# Patient Record
Sex: MALE | Race: WHITE
[De-identification: names, ages, dates, MRNs, and addresses within clinical notes are randomized per-mention and may not be internally consistent; named-entity substitution may affect disease eponyms.]

---

## 2018-12-27 ENCOUNTER — HOSPITAL ENCOUNTER (EMERGENCY)
Dept: HOSPITAL 97 - ER | Age: 83
Discharge: ANOTHER HOSPITAL | End: 2018-12-27
Payer: OTHER GOVERNMENT

## 2018-12-27 VITALS — SYSTOLIC BLOOD PRESSURE: 120 MMHG | TEMPERATURE: 99.6 F | DIASTOLIC BLOOD PRESSURE: 72 MMHG

## 2018-12-27 VITALS — OXYGEN SATURATION: 97 %

## 2018-12-27 DIAGNOSIS — I10: ICD-10-CM

## 2018-12-27 DIAGNOSIS — Z79.01: ICD-10-CM

## 2018-12-27 DIAGNOSIS — E78.00: ICD-10-CM

## 2018-12-27 DIAGNOSIS — K80.10: Primary | ICD-10-CM

## 2018-12-27 DIAGNOSIS — Z95.818: ICD-10-CM

## 2018-12-27 DIAGNOSIS — I25.2: ICD-10-CM

## 2018-12-27 DIAGNOSIS — Z79.82: ICD-10-CM

## 2018-12-27 LAB
ALBUMIN SERPL BCP-MCNC: 4 G/DL (ref 3.4–5)
ALP SERPL-CCNC: 244 U/L (ref 45–117)
ALT SERPL W P-5'-P-CCNC: 439 U/L (ref 12–78)
AST SERPL W P-5'-P-CCNC: 602 U/L (ref 15–37)
BUN BLD-MCNC: 23 MG/DL (ref 7–18)
GLUCOSE SERPLBLD-MCNC: 160 MG/DL (ref 74–106)
HCT VFR BLD CALC: 43.5 % (ref 39.6–49)
INR BLD: 1.03
LIPASE SERPL-CCNC: 58 U/L (ref 73–393)
LYMPHOCYTES # SPEC AUTO: 0.9 K/UL (ref 0.7–4.9)
MAGNESIUM SERPL-MCNC: 2.1 MG/DL (ref 1.8–2.4)
NT-PROBNP SERPL-MCNC: 1473 PG/ML (ref ?–450)
PMV BLD: 8.6 FL (ref 7.6–11.3)
POTASSIUM SERPL-SCNC: 4 MMOL/L (ref 3.5–5.1)
RBC # BLD: 4.99 M/UL (ref 4.33–5.43)
TROPONIN (EMERG DEPT USE ONLY): < 0.02 NG/ML (ref 0–0.04)

## 2018-12-27 PROCEDURE — 87088 URINE BACTERIA CULTURE: CPT

## 2018-12-27 PROCEDURE — 83690 ASSAY OF LIPASE: CPT

## 2018-12-27 PROCEDURE — 74177 CT ABD & PELVIS W/CONTRAST: CPT

## 2018-12-27 PROCEDURE — 83880 ASSAY OF NATRIURETIC PEPTIDE: CPT

## 2018-12-27 PROCEDURE — 96366 THER/PROPH/DIAG IV INF ADDON: CPT

## 2018-12-27 PROCEDURE — 96361 HYDRATE IV INFUSION ADD-ON: CPT

## 2018-12-27 PROCEDURE — 87086 URINE CULTURE/COLONY COUNT: CPT

## 2018-12-27 PROCEDURE — 85025 COMPLETE CBC W/AUTO DIFF WBC: CPT

## 2018-12-27 PROCEDURE — 84484 ASSAY OF TROPONIN QUANT: CPT

## 2018-12-27 PROCEDURE — 93005 ELECTROCARDIOGRAM TRACING: CPT

## 2018-12-27 PROCEDURE — 80048 BASIC METABOLIC PNL TOTAL CA: CPT

## 2018-12-27 PROCEDURE — 80076 HEPATIC FUNCTION PANEL: CPT

## 2018-12-27 PROCEDURE — 85610 PROTHROMBIN TIME: CPT

## 2018-12-27 PROCEDURE — 36415 COLL VENOUS BLD VENIPUNCTURE: CPT

## 2018-12-27 PROCEDURE — 71045 X-RAY EXAM CHEST 1 VIEW: CPT

## 2018-12-27 PROCEDURE — 96365 THER/PROPH/DIAG IV INF INIT: CPT

## 2018-12-27 PROCEDURE — 99285 EMERGENCY DEPT VISIT HI MDM: CPT

## 2018-12-27 PROCEDURE — 83735 ASSAY OF MAGNESIUM: CPT

## 2018-12-27 PROCEDURE — 96375 TX/PRO/DX INJ NEW DRUG ADDON: CPT

## 2018-12-27 NOTE — RAD REPORT
EXAM DESCRIPTION:  [place] Single View12/27/2018 2:26 am

 

CLINICAL HISTORY:  Chest pain

 

COMPARISON:  2016

 

FINDINGS:   The lungs appear clear of acute infiltrate. The heart is moderately enlarged.

 

Aorta is tortuous/ectatic

 

IMPRESSION:   No acute abnormalities displayed

## 2018-12-27 NOTE — EKG
Test Date:    2018-12-27               Test Time:    02:07:10

Technician:                                       

                                                     

MEASUREMENT RESULTS:                                       

Intervals:                                           

Rate:         92                                     

KY:                                                  

QRSD:         134                                    

QT:           376                                    

QTc:          464                                    

Axis:                                                

P:                                                   

KY:                                                  

QRS:          -37                                    

T:            0                                      

                                                     

INTERPRETIVE STATEMENTS:                                       

                                                     

Atrial flutter with variable AV block with premature ventricular or

aberrantly 

conducted complexes

Left axis deviation

Nonspecific intraventricular block

Inferior infarct, age undetermined

Cannot rule out Anterior infarct, age undetermined

Abnormal ECG

Compared to ECG 11/20/2016 05:31:15

Ventricular premature complex(es) now present

Sinus bradycardia no longer present

First degree AV block no longer present

ST (T wave) deviation no longer present

Possible ischemia no longer present

Myocardial infarct finding still present



Electronically Signed On 12-27-18 06:24:39 CST by Rashawn Call

## 2018-12-27 NOTE — ER
Nurse's Notes                                                                                     

 [facility]                                                                

Name: Santo Kyle                                                                                 

Age: 83 yrs                                                                                       

Sex: Male                                                                                         

: 1935                                                                                   

MRN: [id number]                                                                                   

Arrival Date: 2018                                                                          

Time: 01:57                                                                                       

Account#: [id number]                                                                            

Bed 8                                                                                             

Private MD:                                                                                       

Diagnosis: Abdominal tenderness;Cholecystitis;Cholelithiasis                                      

                                                                                                  

Presentation:                                                                                     

                                                                                             

02:07 Presenting complaint: Patient states: upper abd pain since . pt denies N/V/D.       ak1 

      Transition of care: patient was not received from another setting of care. Onset of         

      symptoms was 2018. Risk Assessment: Do you want to hurt yourself or            

      someone else? Patient reports no desire to harm self or others. Initial Sepsis Screen:      

      Does the patient meet any 2 criteria? No. Patient's initial sepsis screen is negative.      

      Does the patient have a suspected source of infection? No. Patient's initial sepsis         

      screen is negative. Care prior to arrival: None.                                            

02:07 Method Of Arrival: Wheelchair                                                           ak1 

02:07 Acuity: TIM 3                                                                           ak1 

02:21 Note pt took 0.4SL nitro at 0100.                                                       ak1 

                                                                                                  

Triage Assessment:                                                                                

02:19 General: Appears uncomfortable, Behavior is calm, cooperative. Pain: Pain began     ak1 

      tonight. pt took 0.4 SL nitro at 0100. EENT: No signs and/or symptoms were reported         

      regarding the EENT system. Neuro: No deficits noted. Cardiovascular: Reports upper abd      

      pain. pt took 0.4 SL nitro at 0100. Respiratory: No deficits noted. GI: Abdomen is          

      round non-distended, Bowel sounds present X 4 quads. Abd is soft and non tender X 4         

      quads. Patient currently denies constipation, diarrhea, nausea, vomiting. : No signs      

      and/or symptoms were reported regarding the genitourinary system. Derm: No signs and/or     

      symptoms reported regarding the dermatologic system. Musculoskeletal: No signs and/or       

      symptoms reported regarding the musculoskeletal system.                                     

                                                                                                  

Historical:                                                                                       

- Allergies:                                                                                      

02:11 No Known Allergies;                                                                     ak1 

- Home Meds:                                                                                      

02:19 amlodipine 10 mg tab 1 tab once daily [Active]; aspirin 81 mg Oral chew 1 tab once      ak1 

      daily [Active]; atorvastatin 40 mg Oral tab 1 tab bedtime [Active]; hydralazine 25 mg       

      Oral tab 1 tab 2 times per day [Active]; lactulose 10 gram/15 mL (15 mL) Oral soln 15       

      mL PRN [Active]; nitroglycerin 0.4 mg SL subl 1 tab PRN [Active]; terazosin 2 mg Oral       

      cap 1 cap bedtime [Active]; zolpidem 5 mg Oral tab 1 tab bedtime [Active]; Metoprolol       

      Tartrate Oral PRN [Active]; Plavix 75 mg Oral tab 1 tab once daily [Active];                

- PMHx:                                                                                           

02:11 CAD; Hypertension; Myocardial infarction; High Cholesterol;                             ak1 

- PSHx:                                                                                           

02:11 Heart stents; bilateral knee replacements; prostate; left eye sx; left middle finger    ak1 

      amputation.;                                                                                

                                                                                                  

- Immunization history:: Adult Immunizations up to date.                                          

- Social history:: Smoking status: Patient/guardian denies using tobacco.                         

- Ebola Screening: : No symptoms or risks identified at this time.                                

- Family history:: not pertinent.                                                                 

                                                                                                  

                                                                                                  

Screenin:21 Abuse screen: Denies threats or abuse. Denies injuries from another. Nutritional        ak1 

      screening: No deficits noted. Tuberculosis screening: No symptoms or risk factors           

      identified. Fall Risk None identified.                                                      

                                                                                                  

Assessment:                                                                                       

02:22 Pain: Pain does not radiate.                                                            ak1 

02:22 Reassessment: Patient appears in no apparent distress at this time. No changes from     ak1 

      previously documented assessment. see triage assessment.                                    

02:39 Reassessment: pt began vomiting, ERP notified and at beside for assessment.             ak1 

03:27 Reassessment: pt drinking oral contrast.                                                ak1 

04:01 Reassessment: pt in CT.                                                                 ak1 

05:49 Reassessment: Patient appears in no apparent distress at this time. No changes from     ak1 

      previously documented assessment. Patient and/or family updated on plan of care and         

      expected duration. Pain level reassessed. Patient is alert, oriented x 3, equal             

      unlabored respirations, skin warm/dry/pink. Patient states symptoms have improved.          

                                                                                                  

Vital Signs:                                                                                      

02:11  / 75; Pulse 103; Resp 20; Temp 97.6(O); Pulse Ox 97% on R/A; Weight 95.25 kg     ak1 

      (R); Height 5 ft. 5 in. (165.10 cm) (R); Pain 5/10;                                         

02:33  / 76; Pulse 106; Resp 22; Temp 99; Pulse Ox 96% on R/A;                          ak1 

03:06  / 81; Pulse 103; Resp 25; Temp 99.4; Pulse Ox 97% on 2 lpm NC;                   ak1 

05:48  / 72; Pulse 102; Resp 20; Temp 99.6; Pulse Ox 97% on 2 lpm NC;                   ak1 

02:11 Body Mass Index 34.95 (95.25 kg, 165.10 cm)                                             ak1 

                                                                                                  

ED Course:                                                                                        

01:57 Patient arrived in ED.                                                                  ag3 

02:07 Aishwarya David, RN is Primary Nurse.                                                     ak1 

02:07 Triage completed.                                                                       ak1 

02:11 Arm band placed on Patient placed in an exam room, on a stretcher, Patient notified of  ak1 

      wait time. EKG completed in triage. Results shown to MD.                                    

02:14 Inserted saline lock: 20 gauge in right antecubital area, using aseptic technique.      ea  

      Blood collected.                                                                            

02:19 EKG done, by ED staff. Patient maintains SpO2 saturation greater than 95% on room air.  ak1 

02:22 Patient has correct armband on for positive identification. Placed in gown. Bed in low  ak1 

      position. Call light in reach. Side rails up X 1. Adult w/ patient. Cardiac monitor on.     

      Pulse ox on. NIBP on. Door closed. Warm blanket given.                                      

02:24 X-ray completed. Portable x-ray completed in exam room. Patient tolerated procedure     kw  

      well.                                                                                       

02:25 XRAY Chest (1 view) In Process Unspecified.                                             EDMS

02:32 Mehran Barrios MD is Attending Physician.                                             [facility] 

03:10 Notified ED physician of a critical lab result(s). AST \T\ ALT.                           jd3

03:56 CT Abd/Pelvis - W/Contrast In Process Unspecified.                                      EDMS

04:09 CT completed. Patient tolerated procedure well. Patient moved to CT via stretcher.      vm2 

      Patient moved back from CT.                                                                 

04:22 No provider procedures requiring assistance completed. Patient transferred, IV remains  ak1 

      in place.                                                                                   

                                                                                                  

Administered Medications:                                                                         

02:39 Drug: Zofran 4 mg Route: IVP; Site: right antecubital;                                  ak1 

02:40 Follow up: Response: No adverse reaction                                                ak1 

02:51 Drug: Pepcid 20 mg Route: IVP; Site: right antecubital;                                 ak1 

02:51 Follow up: Response: No adverse reaction                                                ak1 

02:51 Drug: NS 0.9% 1000 ml Route: IV; Rate: 125 ml/hr; Site: right antecubital;              ak1 

05:50 Follow up: IV Status: Infusion continued upon transfer                                  ak1 

03:26 Drug: Zosyn 3.375 grams Route: IVPB; Infused Over: 60 mins; Site: right antecubital;    ak1 

05:50 Follow up: IV Status: Completed infusion; IV Intake: 100ml                              ak1 

                                                                                                  

                                                                                                  

Intake:                                                                                           

05:50 IV: 100ml; Total: 100ml.                                                                ak1 

                                                                                                  

Outcome:                                                                                          

03:17 ER care complete, transfer ordered by MD. pascual 

06:47 Patient left the ED.                                                                    ak1 

                                                                                                  

Signatures:                                                                                       

Dispatcher [employer]                           EDMS                                                 

Mehran Barrios MD MD cha Whitley, Kimberlee kw Krenek, Amber RN                       RN   ak1                                                  

Yaneli Valadez Elena, RN RN ea Davies, Jonathon, RN RN jd3 Gomez, Alice ag3                                                  

                                                                                                  

**************************************************************************************************

## 2018-12-27 NOTE — RAD REPORT
EXAM DESCRIPTION:  CT - Abdomen   Pelvis W Contrast - 12/27/2018 3:55 am

 

CLINICAL HISTORY:  Abdominal pain with nausea.

 

COMPARISON:  October 2018

 

TECHNIQUE:  Computed axial tomography of the abdomen pelvis was obtained. 100 cc Isovue-300 was admin
istered intravenously. Oral contrast was given. Preliminary report generated by virtual radiologic an
d reviewed prior to dictation

 

All CT scans are performed using dose optimization technique as appropriate and may include automated
 exposure control or mA/KV adjustment according to patient size.

 

FINDINGS:  The liver, spleen, pancreas, adrenal and kidneys appear unremarkable.

 

Diverticula stem from the colon. Minimal stranding is seen adjacent to the distal sigmoid colon

 

Gallstones without gallbladder wall thickening.

 

Small hiatal hernia. Contrast in the esophagus indicates GE reflux.

 

Mild enlargement prostate gland. Small left inguinal hernia

 

IMPRESSION:  Minimal sigmoid diverticulitis. Exam was discussed with Dr. Begum in the Emergency zeb
 at 8:40 a.m. December 27, 2018

## 2018-12-27 NOTE — EDPHYS
Physician Documentation                                                                           

 [facility]                                                                

Name: Santo Kyle                                                                                 

Age: 83 yrs                                                                                       

Sex: Male                                                                                         

: 1935                                                                                   

MRN: [id number]                                                                                   

Arrival Date: 2018                                                                          

Time: 01:57                                                                                       

Account#: [id number]                                                                            

Bed 8                                                                                             

Private MD:                                                                                       

ED Physician Mheran Barrios                                                                      

HPI:                                                                                              

                                                                                             

02:42 This 83 yrs old  Male presents to ER via Wheelchair with complaints of Chest   samara 

      Pain.                                                                                       

02:42 The patient or guardian reports chest pain that is located primarily in the epigastric  samara 

      area. Onset: last night. The pain does not radiate. Associated signs and symptoms: The      

      patient has no apparent associated signs or symptoms. The chest pain is described as        

      aching. Duration: The patient or guardian reports a single episode, that is still           

      ongoing, and unchanged. Modifying factors: The symptoms are alleviated by nothing. the      

      symptoms are aggravated by nothing. Severity of pain: At its worst the pain was mild        

      moderate in the emergency department the pain is unchanged. The patient has not             

      experienced similar symptoms in the past.                                                   

                                                                                                  

Historical:                                                                                       

- Allergies:                                                                                      

02:11 No Known Allergies;                                                                     ak1 

- Home Meds:                                                                                      

02:19 amlodipine 10 mg tab 1 tab once daily [Active]; aspirin 81 mg Oral chew 1 tab once      ak1 

      daily [Active]; atorvastatin 40 mg Oral tab 1 tab bedtime [Active]; hydralazine 25 mg       

      Oral tab 1 tab 2 times per day [Active]; lactulose 10 gram/15 mL (15 mL) Oral soln 15       

      mL PRN [Active]; nitroglycerin 0.4 mg SL subl 1 tab PRN [Active]; terazosin 2 mg Oral       

      cap 1 cap bedtime [Active]; zolpidem 5 mg Oral tab 1 tab bedtime [Active]; Metoprolol       

      Tartrate Oral PRN [Active]; Plavix 75 mg Oral tab 1 tab once daily [Active];                

- PMHx:                                                                                           

02:11 CAD; Hypertension; Myocardial infarction; High Cholesterol;                             ak1 

- PSHx:                                                                                           

02:11 Heart stents; bilateral knee replacements; prostate; left eye sx; left middle finger    ak1 

      amputation.;                                                                                

                                                                                                  

- Immunization history:: Adult Immunizations up to date.                                          

- Social history:: Smoking status: Patient/guardian denies using tobacco.                         

- Ebola Screening: : No symptoms or risks identified at this time.                                

- Family history:: not pertinent.                                                                 

                                                                                                  

                                                                                                  

ROS:                                                                                              

02:42 Constitutional: Negative for fever, chills, and weight loss, Eyes: Negative for injury, samara 

      pain, redness, and discharge, ENT: Negative for injury, pain, and discharge, Neck:          

      Negative for injury, pain, and swelling, Respiratory: Negative for shortness of breath,     

      cough, wheezing, and pleuritic chest pain, Back: Negative for injury and pain, :          

      Negative for injury, bleeding, discharge, and swelling, MS/Extremity: Negative for          

      injury and deformity, Skin: Negative for injury, rash, and discoloration, Neuro:            

      Negative for headache, weakness, numbness, tingling, and seizure, Psych: Negative for       

      depression, anxiety, suicide ideation, homicidal ideation, and hallucinations,              

      Allergy/Immunology: Negative for hives, rash, and allergies, Endocrine: Negative for        

      neck swelling, polydipsia, polyuria, polyphagia, and marked weight changes,                 

      Hematologic/Lymphatic: Negative for swollen nodes, abnormal bleeding, and unusual           

      bruising.                                                                                   

02:42 Cardiovascular: Positive for                                                                

02:42 Abdomen/GI: Positive for abdominal pain, nausea, vomiting, of the epigastric area,          

      right upper quadrant and left upper quadrant.                                               

                                                                                                  

Exam:                                                                                             

02:42 Constitutional:  This is a well developed, well nourished patient who is awake, alert,  samara 

      and in no acute distress. Head/Face:  Normocephalic, atraumatic. Eyes:  Pupils equal        

      round and reactive to light, extra-ocular motions intact.  Lids and lashes normal.          

      Conjunctiva and sclera are non-icteric and not injected.  Cornea within normal limits.      

      Periorbital areas with no swelling, redness, or edema. ENT:  Nares patent. No nasal         

      discharge, no septal abnormalities noted.  Tympanic membranes are normal and external       

      auditory canals are clear.  Oropharynx with no redness, swelling, or masses, exudates,      

      or evidence of obstruction, uvula midline.  Mucous membranes moist. Neck:  Trachea          

      midline, no thyromegaly or masses palpated, and no cervical lymphadenopathy.  Supple,       

      full range of motion without nuchal rigidity, or vertebral point tenderness.  No            

      Meningismus. Chest/axilla:  Normal chest wall appearance and motion.  Nontender with no     

      deformity.  No lesions are appreciated. Respiratory:  Lungs have equal breath sounds        

      bilaterally, clear to auscultation and percussion.  No rales, rhonchi or wheezes noted.     

       No increased work of breathing, no retractions or nasal flaring. Back:  No spinal          

      tenderness.  No costovertebral tenderness.  Full range of motion. Male :  Normal          

      genitalia with no discharge or lesions. Skin:  Warm, dry with normal turgor.  Normal        

      color with no rashes, no lesions, and no evidence of cellulitis. MS/ Extremity:  Pulses     

      equal, no cyanosis.  Neurovascular intact.  Full, normal range of motion. Neuro:  Awake     

      and alert, GCS 15, oriented to person, place, time, and situation.  Cranial nerves          

      II-XII grossly intact.  Motor strength 5/5 in all extremities.  Sensory grossly intact.     

       Cerebellar exam normal.  Normal gait. Psych:  Awake, alert, with orientation to            

      person, place and time.  Behavior, mood, and affect are within normal limits.               

02:42 Cardiovascular: Rate: tachycardic, Rhythm: irregularly irregular, Pulses: Pulses are 4+     

      in bilateral radial, brachial, femoral, popliteal, posterior tibial and and dorsalis        

      pedis arteries.. Heart sounds: normal, Edema: is not appreciated, JVD: is not               

      appreciated.                                                                                

                                                                                                  

Vital Signs:                                                                                      

02:11  / 75; Pulse 103; Resp 20; Temp 97.6(O); Pulse Ox 97% on R/A; Weight 95.25 kg     ak1 

      (R); Height 5 ft. 5 in. (165.10 cm) (R); Pain 5/10;                                         

02:33  / 76; Pulse 106; Resp 22; Temp 99; Pulse Ox 96% on R/A;                          ak1 

03:06  / 81; Pulse 103; Resp 25; Temp 99.4; Pulse Ox 97% on 2 lpm NC;                   ak1 

05:48  / 72; Pulse 102; Resp 20; Temp 99.6; Pulse Ox 97% on 2 lpm NC;                   ak1 

02:11 Body Mass Index 34.95 (95.25 kg, 165.10 cm)                                             ak1 

                                                                                                  

MDM:                                                                                              

02:35 Patient medically screened.                                                             [facility] 

02:47 Data reviewed: vital signs, nurses notes, lab test result(s), EKG, radiologic studies,  [facility] 

      CT scan, plain films.                                                                       

                                                                                                  

                                                                                             

02:11 Order name: Basic Metabolic Panel; Complete Time: 03:11                                 ms  

                                                                                             

02:11 Order name: CBC with Diff; Complete Time: 03:11                                         ms  

                                                                                             

02:11 Order name: LFT's; Complete Time: 03:11                                                 ms  

                                                                                             

02:11 Order name: Magnesium; Complete Time: 03:11                                             ms  

                                                                                             

02:11 Order name: NT PRO-BNP; Complete Time: 03:11                                            ms  

                                                                                             

02:11 Order name: PT-INR; Complete Time: 03:11                                                ms  

                                                                                             

02:11 Order name: Troponin (emerg Dept Use Only); Complete Time: 03:11                        ms  

                                                                                             

02:11 Order name: XRAY Chest (1 view)                                                         ms  

                                                                                             

02:42 Order name: Urine Culture                                                               [facility] 

                                                                                             

02:42 Order name: CT Abd/Pelvis - W/Contrast                                                  [facility] 

                                                                                             

02:55 Order name: Lipase; Complete Time: 03:11                                                EDMS

                                                                                             

02:11 Order name: EKG; Complete Time: 02:11                                                   ms  

                                                                                             

02:11 Order name: Cardiac monitoring; Complete Time: 02:11                                    ms  

                                                                                             

02:11 Order name: EKG - Nurse/Tech; Complete Time: 02:11                                      ms  

                                                                                             

02:11 Order name: IV Saline Lock; Complete Time: 02:11                                        ms  

                                                                                             

02:11 Order name: Labs collected and sent; Complete Time: 02:11                               ms  

                                                                                             

02:11 Order name: O2 Per Protocol; Complete Time: 02:11                                       ms  

                                                                                             

02:11 Order name: O2 Sat Monitoring; Complete Time: 02:11                                     ms  

                                                                                             

02:12 Order name: Urine Dipstick-Ancillary (obtain specimen); Complete Time: 04:41            ms  

                                                                                                  

Administered Medications:                                                                         

02:39 Drug: Zofran 4 mg Route: IVP; Site: right antecubital;                                  ak1 

02:40 Follow up: Response: No adverse reaction                                                ak1 

02:51 Drug: Pepcid 20 mg Route: IVP; Site: right antecubital;                                 ak1 

02:51 Follow up: Response: No adverse reaction                                                ak1 

02:51 Drug: NS 0.9% 1000 ml Route: IV; Rate: 125 ml/hr; Site: right antecubital;              ak1 

05:50 Follow up: IV Status: Infusion continued upon transfer                                  ak1 

03:26 Drug: Zosyn 3.375 grams Route: IVPB; Infused Over: 60 mins; Site: right antecubital;    ak1 

05:50 Follow up: IV Status: Completed infusion; IV Intake: 100ml                              ak1 

                                                                                                  

                                                                                                  

Disposition:                                                                                      

18 03:17 Transfer ordered to [facility]. Diagnosis are Abdominal        

  tenderness, Cholecystitis, Cholelithiasis.                                                      

- Reason for transfer: Higher level of care.                                                      

- Accepting physician is to [facility].                                                                  

- Condition is Stable.                                                                            

- Problem is new.                                                                                 

- Symptoms have improved.                                                                         

                                                                                                  

                                                                                                  

                                                                                                  

Signatures:                                                                                       

Dispatcher [employer]                           EDMS                                                 

Mehran Barrios MD MD cha Solis, Maria ms Krenek, Amber, RN                       RN   ak1                                                  

                                                                                                  

Corrections: (The following items were deleted from the chart)                                    

02:55 02:42 LIPASE+C.LAB.BRZ ordered. [facility]                                                    EDMS

06:47 03:17 2018 03:17 Transfer ordered to [facility]. Diagnosis is ak1 

      Abdominal tenderness; Cholecystitis; Cholelithiasis. Reason for transfer: Higher level      

      of care. Accepting physician is to [facility]. Condition is Stable. Problem is new. Symptoms       

      have improved. samara                                                                          

                                                                                                  

**************************************************************************************************